# Patient Record
Sex: MALE
[De-identification: names, ages, dates, MRNs, and addresses within clinical notes are randomized per-mention and may not be internally consistent; named-entity substitution may affect disease eponyms.]

---

## 2017-06-01 ENCOUNTER — APPOINTMENT (OUTPATIENT)
Dept: PULMONOLOGY | Facility: CLINIC | Age: 22
End: 2017-06-01

## 2017-06-15 ENCOUNTER — APPOINTMENT (OUTPATIENT)
Dept: PULMONOLOGY | Facility: CLINIC | Age: 22
End: 2017-06-15

## 2017-06-15 VITALS
BODY MASS INDEX: 36.08 KG/M2 | WEIGHT: 252 LBS | OXYGEN SATURATION: 98 % | SYSTOLIC BLOOD PRESSURE: 110 MMHG | TEMPERATURE: 98.3 F | HEART RATE: 75 BPM | HEIGHT: 70 IN | DIASTOLIC BLOOD PRESSURE: 76 MMHG

## 2017-06-15 RX ORDER — ALBUTEROL SULFATE 90 UG/1
108 (90 BASE) AEROSOL, METERED RESPIRATORY (INHALATION)
Qty: 2 | Refills: 5 | Status: ACTIVE | COMMUNITY
Start: 2017-06-15 | End: 1900-01-01

## 2017-06-15 RX ORDER — ALBUTEROL SULFATE 90 UG/1
108 (90 BASE) AEROSOL, METERED RESPIRATORY (INHALATION)
Qty: 2 | Refills: 5 | Status: DISCONTINUED | COMMUNITY
Start: 2017-06-15 | End: 2017-06-15

## 2021-04-01 ENCOUNTER — TRANSCRIPTION ENCOUNTER (OUTPATIENT)
Age: 26
End: 2021-04-01

## 2021-04-01 ENCOUNTER — APPOINTMENT (OUTPATIENT)
Dept: PULMONOLOGY | Facility: CLINIC | Age: 26
End: 2021-04-01
Payer: COMMERCIAL

## 2021-04-01 DIAGNOSIS — G47.19 OTHER HYPERSOMNIA: ICD-10-CM

## 2021-04-01 DIAGNOSIS — F51.04 PSYCHOPHYSIOLOGIC INSOMNIA: ICD-10-CM

## 2021-04-01 PROCEDURE — 99205 OFFICE O/P NEW HI 60 MIN: CPT | Mod: 95

## 2021-04-01 NOTE — HISTORY OF PRESENT ILLNESS
[FreeTextEntry1] : 24 y/o M with PMH of asthma presented to his telehealth visit as an initial encounter.\par \par Of note, the patient has been having issues with insomnia for some time.  It has been worsened by the loss of his parents recently.  He has ADHD and has been treated with Ritalin, Concerta and now on Vyvanse.  For his insomnia, he has tried Trazodone, Remeron, Tasimelteon and now on Amitriptyline.  All medications are filled by his psychiatrist.  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  Notes at least one awakening in the middle of the night requiring the need to use the bathroom.  Wakes up during the week at 5:30 a and takes his Vyvanse.  On the weekends he will sleep in until 7-8 am.  He has EDS with an ESS of 13 on today's encounter. \par \par ____________________________________________________________________\par EPWORTH SLEEPINESS SCALE\par \par How likely are you to doze off or fall asleep in the situations described below, in contrast to feeling just tired?  \par This refers to your usual way of life in recent times.  \par Even if you haven't done some of these things recently, try to work out how they would have affected you.\par Use the following scale to choose one most appropriate number for each situation.\par \par Chance of dozing.............Situation\par .............1...........................Sitting and reading\par .............2...........................Watching TV\par .............2...........................Sitting inactive in a public place (eg a theatre or a meeting)\par .............3...........................As a passenger in a car for an hour without a break\par .............2...........................Lying down to rest in the afternoon when circumstances permit\par .............1...........................Sitting and talking to someone\par .............1...........................Sitting quietly after lunch without alcohol\par .............1...........................In a car, while stopped for a few minutes in traffic\par \par ............13............................TOTAL SCORE\par \par 0 = Never would doze\par 1 = Slight chance of dozing\par 2 = Moderate chance of dozing\par 3 = High chance of dozing \par ______________________________________________________________________\par  [Snoring] : snoring [Witnessed Apneas] : no witnessed sleep apnea [Frequent Nocturnal Awakening] : no nocturnal awakening [Daytime Somnolence] : daytime somnolence [ESS: ___] : ESS score [unfilled] [Unintentional Sleep while Active] : no unintentional sleep while active [Unintentional Sleep While Inactive] : unintentional sleep while inactive [Awakes Unrefreshed] : awakening unrefreshed [Awakes with Headache] : no headache upon awakening [DIS] : DIS

## 2021-04-01 NOTE — ASSESSMENT
[FreeTextEntry1] : 24 y/o M with PMH of asthma presented to his telehealth visit as an initial encounter.  His main complaints is chornic insomnia and excessive daytime somnolence.  His sleep related issues have been worsened by the loss of his parents recently.  He has ADHD and has been treated with Ritalin, Concerta and now on Vyvanse.  For his insomnia, he has tried Trazodone, Remeron, Tasimelteon and now on Amitriptyline.  All medications are filled by his psychiatrist. 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  Notes at least one awakening in the middle of the night requiring the need to use the bathroom.  Wakes up during the week at 5:30 a and takes his Vyvanse.  On the weekends he will sleep in until 7-8 am.  He has EDS with an ESS of 13 on today's encounter. \par \par His EDS may be related to insufficient sleep time as well as possbly sleep disordered breathing.  Stimulus control, sleep restriction, and sleep hygiene measures were reviewed with the patient. He was advised to anchor a daily fixed mayuri up time at 7 am(patient's preferred wake up time) with reporting to bed at 12:30 am on Week 1. On Week 2, he will report to bed 30 - minutes earlier (to 12 am) and on Week 3 at 11:30 pm to regularize, and improve sleep quality and duration. Patient was advised to avoid spending large amounts of time in bed awake, to get into bed only when sleepy in order to increase sleep efficiency and to not use electronic devices (I-PAD, computer, phone) while in bed when he cannot sleep. The role of this intervention in strengthening the connection between the bed, bedroom environment and the ability to fall asleep was explained to the patient. He was refered to Dr. Ching for CBT.\par \par He snores and has nocturnal awakenings which may be related to SDB. Assessment and treatment of CANDIE is important for management of somnolence and nonrestorative sleep. The patient was referred to the [facility] for diagnostic HSAT for further assessment. The ramifications of obstructive sleep apnea and its potential therapeutic modalities were discussed with the patient. The dangers of drowsy driving were discussed with the patient.  The patient was warned to avoid drowsy driving. The patient will followup after results of this study are available.\par  \par Finn Canada, \par Pulmonary, Critical Care and Sleep Medicine Attending \par \par

## 2021-04-01 NOTE — REASON FOR VISIT
[Home] : at home, [unfilled] , at the time of the visit. [Medical Office: ([facility])___] : at the medical office located in  [Verbal consent obtained from patient] : the patient, [unfilled] [Initial Evaluation] : an initial evaluation

## 2021-05-12 ENCOUNTER — APPOINTMENT (OUTPATIENT)
Dept: NEUROLOGY | Facility: CLINIC | Age: 26
End: 2021-05-12